# Patient Record
Sex: MALE | Race: WHITE
[De-identification: names, ages, dates, MRNs, and addresses within clinical notes are randomized per-mention and may not be internally consistent; named-entity substitution may affect disease eponyms.]

---

## 2018-03-18 ENCOUNTER — HOSPITAL ENCOUNTER (EMERGENCY)
Dept: HOSPITAL 17 - NEPJ | Age: 28
Discharge: HOME | End: 2018-03-18
Payer: SELF-PAY

## 2018-03-18 VITALS
DIASTOLIC BLOOD PRESSURE: 73 MMHG | HEART RATE: 72 BPM | OXYGEN SATURATION: 100 % | TEMPERATURE: 98.9 F | RESPIRATION RATE: 16 BRPM | SYSTOLIC BLOOD PRESSURE: 120 MMHG

## 2018-03-18 DIAGNOSIS — F17.200: ICD-10-CM

## 2018-03-18 DIAGNOSIS — F43.12: Primary | ICD-10-CM

## 2018-03-18 DIAGNOSIS — F10.29: ICD-10-CM

## 2018-03-18 PROCEDURE — 99284 EMERGENCY DEPT VISIT MOD MDM: CPT
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
## 2018-03-18 NOTE — PD
HPI


Chief Complaint:  Psychiatric Symptoms


Time Seen by Provider:  15:52


Travel History


International Travel<30 days:  No


Contact w/Intl Traveler<30days:  No


Traveled to known affect area:  No





History of Present Illness


HPI


27-year-old male presents to the emergency department transfer from [facility] for psychiatric evaluation.  The patient was placed under Baker 

act there.  The patient has been already medically cleared by the physician at 

[facility].  Upon my exam, the patient is already been seen and 

evaluated by our psychiatrist.  The Andino act has been lifted.  The 

psychiatrist did strongly recommend he stay voluntary for psychiatric admission

, but the patient declined.  I spoke to the psychiatrist myself he states the 

patient can be safely discharged.  The patient denies any suicidal or homicidal 

ideation to me.  He has history PTSD.  He states that he will say things when 

he is intoxicated.  Moderate severity.





PFSH


Past Medical History


Medical History:  Denies Significant Hx


Diminished Hearing:  No


Immunizations Current:  Yes


Pregnant?:  Not Pregnant





Past Surgical History


Surgical History:  No Previous Surgery





Social History


Alcohol Use:  Yes (750 ml boo rangel PER NIGHT)


Tobacco Use:  Yes (1 PPD)


Substance Use:  No (HX METH, POLYSUBSTANCE)





Allergies-Medications


(Allergen,Severity, Reaction):  


Coded Allergies:  


     No Known Allergies (Verified  Allergy, Unknown, 3/18/18)


Reported Meds & Prescriptions





Reported Meds & Active Scripts


Active


No Active Prescriptions or Reported Medications    








Review of Systems


Except as stated in HPI:  all other systems reviewed are Neg





Physical Exam


Narrative


GENERAL: Well-nourished, well-developed male patient, afebrile.


SKIN: Focused skin assessment warm/dry.


HEAD: Normocephalic.  Atraumatic.


EYES: No scleral icterus. No injection or drainage. 


NECK: Supple, trachea midline. No JVD or lymphadenopathy.


CARDIOVASCULAR: Regular rate and rhythm without murmurs, gallops, or rubs. 


RESPIRATORY: Breath sounds equal bilaterally. No accessory muscle use.  Lung 

sounds are clear to auscultation.


GASTROINTESTINAL: Abdomen soft, non-tender, nondistended. 


MUSCULOSKELETAL: No cyanosis, or edema. 


PSYCHIATRIC: No delusional thought processes. No hallucinations.





Data


Data


Last Documented VS





Vital Signs








  Date Time  Temp Pulse Resp B/P (MAP) Pulse Ox O2 Delivery O2 Flow Rate FiO2


 


3/18/18 14:50     (89)    


 


3/18/18 11:44 98.9 72 16  100 Room Air  











MDM


Medical Decision Making


Medical Screen Exam Complete:  Yes


Emergency Medical Condition:  Yes


Medical Record Reviewed:  Yes


Differential Diagnosis


Depression versus anxiety versus PTSD versus alcohol dependence


Narrative Course


27-year-old male presents to the emergency department under Baker act, already 

cleared by [facility].  Her psychiatrist has seen and lifted the 

Andino act.  The patient is requesting to go home.  He denies any suicidal 

ideation to me.





Diagnosis





 Primary Impression:  


 Chronic post-traumatic stress disorder (PTSD)


 Additional Impression:  


 Alcohol dependence


 Qualified Codes:  F10.29 - Alcohol dependence with unspecified alcohol-induced 

disorder


Referrals:  


Psychiatrist


call for appointment


Patient Instructions:  Alcohol Dependence (ED), General Instructions, Post 

Traumatic Stress Disorder (ED)





***Additional Instructions:  


Follow-up with a psychiatrist.


Return to the emergency department for any acute worsening of symptoms


***Med/Other Pt SpecificInfo:  No Change to Meds


Scripts


No Active Prescriptions or Reported Meds


Disposition:  01 DISCHARGE HOME


Condition:  Stable











Qing Pritchett Mar 18, 2018 16:17